# Patient Record
Sex: MALE | Race: WHITE | Employment: OTHER | URBAN - METROPOLITAN AREA
[De-identification: names, ages, dates, MRNs, and addresses within clinical notes are randomized per-mention and may not be internally consistent; named-entity substitution may affect disease eponyms.]

---

## 2017-04-04 ENCOUNTER — HOSPITAL ENCOUNTER (OUTPATIENT)
Age: 72
Setting detail: OUTPATIENT SURGERY
Discharge: HOME | End: 2017-04-04
Attending: SPECIALIST | Admitting: SPECIALIST
Payer: MEDICARE

## 2017-04-04 ENCOUNTER — ANESTHESIA (OUTPATIENT)
Dept: ENDOSCOPY | Age: 72
End: 2017-04-04
Payer: MEDICARE

## 2017-04-04 ENCOUNTER — SURGERY (OUTPATIENT)
Age: 72
End: 2017-04-04

## 2017-04-04 ENCOUNTER — ANESTHESIA EVENT (OUTPATIENT)
Dept: ENDOSCOPY | Age: 72
End: 2017-04-04
Payer: MEDICARE

## 2017-04-04 VITALS
HEART RATE: 69 BPM | WEIGHT: 185 LBS | RESPIRATION RATE: 16 BRPM | OXYGEN SATURATION: 98 % | BODY MASS INDEX: 27.4 KG/M2 | SYSTOLIC BLOOD PRESSURE: 136 MMHG | DIASTOLIC BLOOD PRESSURE: 78 MMHG | HEIGHT: 69 IN | TEMPERATURE: 97.8 F

## 2017-04-04 LAB
GLUCOSE BLD STRIP.AUTO-MCNC: 138 MG/DL (ref 65–100)
SERVICE CMNT-IMP: ABNORMAL

## 2017-04-04 PROCEDURE — 76060000031 HC ANESTHESIA FIRST 0.5 HR: Performed by: SPECIALIST

## 2017-04-04 PROCEDURE — 82962 GLUCOSE BLOOD TEST: CPT

## 2017-04-04 PROCEDURE — 74011250636 HC RX REV CODE- 250/636

## 2017-04-04 PROCEDURE — 76040000019: Performed by: SPECIALIST

## 2017-04-04 RX ORDER — FENTANYL CITRATE 50 UG/ML
100 INJECTION, SOLUTION INTRAMUSCULAR; INTRAVENOUS ONCE
Status: DISCONTINUED | OUTPATIENT
Start: 2017-04-04 | End: 2017-04-04 | Stop reason: HOSPADM

## 2017-04-04 RX ORDER — SODIUM CHLORIDE 9 MG/ML
50 INJECTION, SOLUTION INTRAVENOUS CONTINUOUS
Status: DISCONTINUED | OUTPATIENT
Start: 2017-04-04 | End: 2017-04-04 | Stop reason: HOSPADM

## 2017-04-04 RX ORDER — MIDAZOLAM HYDROCHLORIDE 1 MG/ML
.25-5 INJECTION, SOLUTION INTRAMUSCULAR; INTRAVENOUS
Status: DISCONTINUED | OUTPATIENT
Start: 2017-04-04 | End: 2017-04-04 | Stop reason: HOSPADM

## 2017-04-04 RX ORDER — DEXTROMETHORPHAN/PSEUDOEPHED 2.5-7.5/.8
1.2 DROPS ORAL
Status: DISCONTINUED | OUTPATIENT
Start: 2017-04-04 | End: 2017-04-04 | Stop reason: HOSPADM

## 2017-04-04 RX ORDER — ATROPINE SULFATE 0.1 MG/ML
0.5 INJECTION INTRAVENOUS
Status: DISCONTINUED | OUTPATIENT
Start: 2017-04-04 | End: 2017-04-04 | Stop reason: HOSPADM

## 2017-04-04 RX ORDER — PROPOFOL 10 MG/ML
INJECTION, EMULSION INTRAVENOUS AS NEEDED
Status: DISCONTINUED | OUTPATIENT
Start: 2017-04-04 | End: 2017-04-04 | Stop reason: HOSPADM

## 2017-04-04 RX ORDER — PROPOFOL 10 MG/ML
INJECTION, EMULSION INTRAVENOUS
Status: DISCONTINUED | OUTPATIENT
Start: 2017-04-04 | End: 2017-04-04 | Stop reason: HOSPADM

## 2017-04-04 RX ORDER — FLUMAZENIL 0.1 MG/ML
0.2 INJECTION INTRAVENOUS
Status: DISCONTINUED | OUTPATIENT
Start: 2017-04-04 | End: 2017-04-04 | Stop reason: HOSPADM

## 2017-04-04 RX ORDER — EPINEPHRINE 0.1 MG/ML
1 INJECTION INTRACARDIAC; INTRAVENOUS
Status: DISCONTINUED | OUTPATIENT
Start: 2017-04-04 | End: 2017-04-04 | Stop reason: HOSPADM

## 2017-04-04 RX ORDER — NALOXONE HYDROCHLORIDE 0.4 MG/ML
0.4 INJECTION, SOLUTION INTRAMUSCULAR; INTRAVENOUS; SUBCUTANEOUS
Status: DISCONTINUED | OUTPATIENT
Start: 2017-04-04 | End: 2017-04-04 | Stop reason: HOSPADM

## 2017-04-04 RX ORDER — SODIUM CHLORIDE 9 MG/ML
INJECTION, SOLUTION INTRAVENOUS
Status: DISCONTINUED | OUTPATIENT
Start: 2017-04-04 | End: 2017-04-04 | Stop reason: HOSPADM

## 2017-04-04 RX ADMIN — PROPOFOL 100 MG: 10 INJECTION, EMULSION INTRAVENOUS at 09:54

## 2017-04-04 RX ADMIN — PROPOFOL 100 MCG/KG/MIN: 10 INJECTION, EMULSION INTRAVENOUS at 09:54

## 2017-04-04 RX ADMIN — SODIUM CHLORIDE: 9 INJECTION, SOLUTION INTRAVENOUS at 09:38

## 2017-04-04 NOTE — IP AVS SNAPSHOT
[address] [place] [address] 
[phone] Patient: Sveta Durant MRN: [id number] [facility]:9/80/9573 You are allergic to the following Allergen Reactions Zocor (Simvastatin) Myalgia Motrin (Ibuprofen) Rash Other Medication Unknown (comments) SHRIMP Recent Documentation Height Weight BMI Smoking Status 1.753 m 83.9 kg 27.32 kg/m2 Never Smoker Emergency Contacts Name Discharge Info Relation Home Work Mobile Neris Mcguire  Child [2] [phone] About your hospitalization You were admitted on:  April 4, 2017 You last received care in the:  [facility] ENDOSCOPY You were discharged on:  April 4, 2017 Unit phone number:  [phone] Why you were hospitalized Your primary diagnosis was:  Not on File Providers Seen During Your Hospitalizations Provider Role Specialty Primary office phone Geovani Baez MD Attending Provider Gastroenterology [phone] Your Primary Care Physician (PCP) Primary Care Physician Office Phone Office Fax Rai Andersen [phone] Follow-up Information None Current Discharge Medication List  
  
CONTINUE these medications which have NOT CHANGED Dose & Instructions Dispensing Information Comments Morning Noon Evening Bedtime  
 amLODIPine 10 mg tablet Commonly known as:  Claudell Hesselbach Your last dose was: Your next dose is:    
   
   
 Dose:  5 mg Take 5 mg by mouth daily. Indications: HYPERTENSION Refills:  0  
     
   
   
   
  
 aspirin 81 mg tablet Your last dose was: Your next dose is:    
   
   
 Dose:  81 mg Take 81 mg by mouth daily. Refills:  0  
     
   
   
   
  
 clopidogrel 75 mg Tab Commonly known as:  PLAVIX Your last dose was:     
   
Your next dose is:    
   
   
 Dose:  75 mg  
 Take 1 Tab by mouth daily. Quantity:  30 Tab Refills:  6  
     
   
   
   
  
 coenzyme Q-10 200 mg capsule Commonly known as:  CO Q-10 Your last dose was: Your next dose is: Take  by mouth daily. Refills:  0  
     
   
   
   
  
 FLEXERIL PO Your last dose was: Your next dose is: Take  by mouth as needed. Refills:  0  
     
   
   
   
  
 metFORMIN  mg tablet Commonly known as:  glucophage XR Your last dose was: Your next dose is: TAKE 2 TABLETS BY MOUTH  TWICE A DAY Quantity:  60 Tab Refills:  5  
     
   
   
   
  
 metoprolol tartrate 25 mg tablet Commonly known as:  LOPRESSOR Your last dose was: Your next dose is:    
   
   
 Dose:  25 mg Take 1 Tab by mouth every twelve (12) hours. Quantity:  60 Tab Refills:  6  
     
   
   
   
  
 oxyCODONE IR 5 mg immediate release tablet Commonly known as:  Calderon Andrzej Your last dose was: Your next dose is:    
   
   
 Dose:  5 mg Take 1 Tab by mouth every four (4) hours as needed. Quantity:  50 Tab Refills:  0  
     
   
   
   
  
 valsartan-hydroCHLOROthiazide 160-12.5 mg per tablet Commonly known as:  DIOVAN HCT Your last dose was: Your next dose is: TAKE 1 TABLET BY MOUTH   EACH DAY Quantity:  30 Tab Refills:  5 VITAMIN B-12 PO Your last dose was: Your next dose is: Take  by mouth daily. Refills:  0 Discharge Instructions Derian Dhaliwal. Ari Nava, [address] [place] [address] 
[phone] April 4, 2017 Bridget Conti YOB: 1945 COLONOSCOPY DISCHARGE INSTRUCTIONS If there is redness at IV site you should apply warm compress to area.   If redness or soreness persist contact Dr. Luis Alberto Mullins' or your primary care doctor. There may be a slight amount of blood passed from the rectum. Gaseous discomfort may develop, but walking, belching will help relieve this. You may not operate a vehicle for 12 hours You may not operate machinery or dangerous appliances for rest of today You may not drink alcoholic beverages for 12 hours Avoid making any critical decisions for 24 hours DIET: 
You may resume your normal diet, but some patients find that heavy or large meals may lead to indigestion or vomiting. I suggest a light meal as first food intake. MEDICATIONS: 
The use of some over-the-counter pain medication may lead to bleeding after colon biopsies or polyp removal.  Tylenol (also called acetaminophen) is safe to take even if you have had colonoscopy with polyp removal.  Based on the procedure you had today you may safely take aspirin or aspirin-like products for the next ten (10) days. Remember that Tylenol (also called acetaminophen) is safe to take after colonoscopy even if you have had biopsies or polyps removed. ACTIVITY: 
You may resume your normal household activities, but it is recommended that you spend the remainder of the day resting -  avoid any strenuous activity. CALL DR. Nabil Osborne' OFFICE IF: Increasing pain, nausea, vomiting Abdominal distension (swelling) Significant new or increased bleeding (oral or rectal) Fever/Chills Chest pain/shortness of breath Additional instructions:  
Normal colonoscopy and upper GI endoscopy today. No cancer or ulcer or bleeding. I suggest that you try to take iron for 2 months and then have your blood retested to see if that solves the anemia. It was an honor to be your doctor today. Please let me or my office staff know if you have any feedback about today's procedure. Aimee Huff MD 
 
Colonoscopy saves lives, and can prevent colon cancer.   Everyone aged 48 or older needs colonoscopy. Tell your family and friends: get the test! 
 
 
 
 
Discharge Orders None Introducing [facility] [place] [facility]! Dear Pérez Samuel: 
Thank you for requesting a [employer] account. Our records indicate that you already have an active [employer] account. You can access your account anytime at [email or web address] Did you know that you can access your hospital and ER discharge instructions at any time in [employer]? You can also review all of your test results from your hospital stay or ER visit. Additional Information If you have questions, please visit the Frequently Asked Questions section of the [employer] website at [email or web address] Remember, [employer] is NOT to be used for urgent needs. For medical emergencies, dial 911. Now available from your iPhone and Android! General Information Please provide this summary of care documentation to your next provider. Patient Signature:  ____________________________________________________________ Date:  ____________________________________________________________  
  
Marilee Brought Provider Signature:  ____________________________________________________________ Date:  ____________________________________________________________

## 2017-04-04 NOTE — INTERVAL H&P NOTE
H&P Update:  Aneta Pizarro was seen and examined. History and physical has been reviewed. The patient has been examined.  There have been no significant clinical changes since the completion of the originally dated History and Physical.    Signed By: Fox Faulkner MD     April 4, 2017 10:07 AM

## 2017-04-04 NOTE — PROCEDURES
[facility] DAYANA Baltazar MD  [phone]      2017    Esophagogastroduodenoscopy & Colonoscopy Procedure Note  [place]  : 1945  Artur Webber Medical Record Number: [id number]      Indications:    Anemia  Screening Colonoscopy   Referring Physician:  You Anne MD  Anesthesia/Sedation: Conscious Sedation/Moderate Sedation/MAC  Endoscopist:  Dr. Kristina Sarah  Complications:  None  Estimated Blood Loss:  None    Permit:  The indications, risks, benefits and alternatives were reviewed with the patient or their decision maker who was provided an opportunity to ask questions and all questions were answered. The specific risks of esophagogastroduodenoscopy with conscious sedation were reviewed, including but not limited to anesthetic complication, bleeding, adverse drug reaction, missed lesion, infection, IV site reactions, and intestinal perforation which would lead to the need for surgical repair. Alternatives to EGD and colonoscopy including radiographic imaging, observation without testing, or laboratory testing were reviewed as well as the limitations of those alternatives discussed. After considering the options and having all their questions answered, the patient or their decision maker provided both verbal and written consent to proceed. -----------EGD------------   Procedure in Detail:  After obtaining informed consent, positioning of the patient in the left lateral decubitus position, and conduction of a pre-procedure pause or \"time out\" the endoscope was introduced into the mouth and advanced to the duodenum. A careful inspection was made, and findings or interventions are described below.     Findings:   Esophagus:normal  Stomach: A few small fundic gland polyps otherwise normal.  Duodenum/jejunum: normal        ----------Colonoscopy-----------    Procedure in Detail:  After obtaining informed consent, positioning of the patient in the left lateral decubitus position, and conduction of a pre-procedure pause or \"time out\" the endoscope was introduced into the anus and advanced to the cecum, which was identified by the ileocecal valve and appendiceal orifice. The quality of the colonic preparation was good. A careful inspection was made as the colonoscope was withdrawn, findings and interventions are described below. Findings: There is diverticulosis in the sigmoid colon without complications such as bleeding, inflammatory change, or luminal narrowing. In the rectum, medium internal hemorrhoids are noted without bleeding.      ------------------------------  Specimens:    none    Complications:   None; patient tolerated the procedure well. Impressions:  EGD:  Normal   Colonoscopy: Diverticulosis and hemorrhoids. Recommendations:      - Iron supplementation and repeat blood counts after 6-8 weeks    Thank you for entrusting me with this patient's care. Please do not hesitate to contact me with any questions or if I can be of assistance with any of your other patients' GI needs.     Signed By: Olvin Powers MD                        April 4, 2017

## 2017-04-04 NOTE — H&P
Date: 3/23/2017 1:15 PM   Patient Name: Vaishali Sofia   Account #: [id number]    Gender: Male    (age): 1945 (72)       Provider:     Blayne Bennett. Wilmer Simmons MD        Referring Physician:     Jakob Diaz [address], [place], [address]  [phone] (phone)  [phone] (fax)     Shea Jeffrey MD  [address] [address]  [phone] (phone)  [phone] (fax)     Karmenelene Grain 706 [facility], [address], [address] [facility]  [phone] (phone)  [phone] (fax)        Chief Complaint: Anemia           History of Present Illness:   Vaishali Sofia is seen for an initial visit today. Anemia detected on routine tests. Hgb 13. He reports occasional diarrhea  Black stool 2 weeks ago, no abdominal pain, no vomiting, no weight loss    Overall his health is not good, has coronary disease, status post CABG and stents and has pacer/defibrillator, has diabetes with glove neuropathy and there is concern he may have a form of ataxia. We discussed the indications,r isks, benefits and alternatives to diagnostic EGD and colonoscopy and he asked we proceed. ? ? ? Sukhwinder García? ? Trey Flores? is seen for an initial visit today. ? Anemia detected on routine tests. Hgb 13. He reports occasional diarrhea  Black stool 2 weeks ago, no abdominal pain, no vomiting, no weight loss    Overall his health is not good, has coronary disease, status post CABG and stents and has pacer/defibrillator, has diabetes with glove neuropathy and there is concern he may have a form of ataxia. We discussed the indications,r isks, benefits and alternatives to diagnostic EGD and colonoscopy and he asked we proceed. ? Past Medical History      Medical Conditions: Aortic Valve Disorder  Cardiac implant/device  High blood pressure  High cholesterol  Pacemaker  S/P Defibralltor  S/P Stents x3  Thyroid disease   Surgical Procedures:  Other major surgeries:, 2013  Pacemaker  Open Heart   Dx Studies: No Prior Diagnostic Studies   Medications: amlodipine 2.5 mg  bumetanide 0.5 mg  clopidogrel 75 mg  Colcrys 0.6 mg  Foltanx 3-35-2 mg  glimepiride 2 mg  levothyroxine 25 mcg  losartan 25 mg  metformin 500 mg  metoprolol succinate 25 mg  pantoprazole 40 mg  tamsulosin 0.4 mg  Viagra 25 mg   Allergies: Motrin PM  Shrimp   Immunizations: Flu vaccine, 2016      Social History      Alcohol: None   Tobacco: Never smoker   Drugs: None   Exercise: Exercise less than 3 times a week. Caffeine: None   Marital Status:          Occupation:               Family History No history of Colon Cancer, Colon Polyps, Esophogeal Cancer, GI Cancers, IBD (Crohn's or UC), Liver disease        Review of Systems:   Cardiovascular: Denies chest pain, irregular heart beat, palpitations, peripheral edema, syncope, Sweats. Constitutional: Denies fatigue, fever, loss of appetite, weight gain, weight loss. ENMT: Denies nose bleeds, sore throat, hearing loss. Endocrine: Denies excessive thirst, heat intolerance. Eyes: Denies loss of vision. Gastrointestinal: Denies abdominal pain, abdominal swelling, change in bowel habits, constipation, diarrhea, Bloating/gas, heartburn, jaundice, nausea, rectal bleeding, stomach cramps, vomiting, dysphagia, rectal pain, Stool incontinence. Genitourinary: Denies dark urine, dysuria, frequent urination, hematuria, incontinence. Hematologic/Lymphatic: Denies easy bruising, prolonged bleeding. Integumentary: Denies itching, rashes, sun sensitivity. Musculoskeletal: Presents suffers from gout, muscle weakness. Denies arthritis, back pain, joint pain, stiffness. Neurological: Denies dizziness, fainting, frequent headaches, memory loss. Psychiatric: Denies anxiety, depression, difficulty sleeping, hallucinations, nervousness, panic attacks, paranoia. Respiratory: Denies cough, dyspnea, wheezing.       Vital Signs:   BP  (mmHg)  Pulse  (ppm) Rhythm Weight (lbs/oz) Height (ft/in) BMI Resp/min Temp   121/74 92 Regular 188 /   26.22 12 98.8 (F)      Physical Exam:   Constitutional:      Appearance: No distress, appears comfortable. Communication: Understands/receives spoken information. Skin:      Inspection: No rash, no jaundice. Head/face: Inspection: Normacephalic, atraumatic. Eyes:      Conjunctivae/lids: Normal.   Pupils/irises: Pupils equal, round and normal.   ENMT:      External: Normal.   Hearing: Normal.   Neck:      Neck: Normal appearance, trachea midline. Jugular veins: No JVD noted. Respiratory:      Effort: Normal respiratory effort, comfortable, speaks in complete sentences. Musculoskeletal:      Gait/station: normal.   Digits/nails: Normal, no spooning of nails, clubbing, or splinter hemorrhages, no clubbing, cyanosis, petechiae or other inflammatory conditions. Psychiatric:      Judgment/insight: Normal, normal judgement, normal insight. Mood and affect: Normal mood, affect full, no evidence of depression, anxiety or agitation. Lab Results: No Lab Results      Impressions: Anemia, unspecified  Diarrhea, unspecified? ? Anemia, unspecified? ?  ? ? Diarrhea, unspecified? Assessment: ?         Plan: Upper Endoscopy  Colonoscopy? ? Upper Endoscopy? ?  ? ? Colonoscopy? Risk & Medical Necessity: The patient requires Moderate Severity care for this visit. Diagnosis and management options are Multiple. The amount of data reviewed and/or ordered is Minimal/None. The level of risk is Moderate. Notes:              Joselyn Kimball MD     Electronically signed on 3/23/2017 1:53:37 PM by Joselyn Kimball, [facility] Thompsonre De Katalina, MRN [id number],  1945 First Visit,

## 2017-04-04 NOTE — ANESTHESIA PREPROCEDURE EVALUATION
Anesthetic History   No history of anesthetic complications            Review of Systems / Medical History  Patient summary reviewed, nursing notes reviewed and pertinent labs reviewed    Pulmonary        Sleep apnea (no cpap)           Neuro/Psych         TIA    Comments: Essential tremors Cardiovascular    Hypertension  Valvular problems/murmurs (aortic valve (porcine))        Pacemaker (2015  ), cardiac stents (on plavix (yesterday)) and CABG    Exercise tolerance: >4 METS  Comments: metoprolol (LOPRESSOR) 25 mg tablet   4/4/2017       GI/Hepatic/Renal  Within defined limits              Endo/Other    Diabetes    Anemia     Other Findings   Comments: ANEMIA/DIARRHEA  spinal stabalization with fusion X5           Physical Exam    Airway  Mallampati: II  TM Distance: 4 - 6 cm  Neck ROM: normal range of motion   Mouth opening: Normal     Cardiovascular  Regular rate and rhythm,  S1 and S2 normal,  no murmur, click, rub, or gallop  Rhythm: regular  Rate: normal         Dental    Dentition: Full upper dentures and Lower partial plate     Pulmonary  Breath sounds clear to auscultation               Abdominal  GI exam deferred       Other Findings            Anesthetic Plan    ASA: 4  Anesthesia type: MAC            Anesthetic plan and risks discussed with: Patient

## 2017-04-04 NOTE — ANESTHESIA POSTPROCEDURE EVALUATION
Post-Anesthesia Evaluation and Assessment    Patient: Brittney Boogie MRN: [id number]  SSN: [id number]    YOB: 1945  Age: 74 y.o. Sex: male       Cardiovascular Function/Vital Signs  Visit Vitals    /64    Pulse 69    Temp 36.4 °C (97.6 °F)    Resp 18    Ht 5' 9\" (1.753 m)    Wt 83.9 kg (185 lb)    SpO2 95%    BMI 27.32 kg/m2       Patient is status post MAC anesthesia for Procedure(s):  ESOPHAGOGASTRODUODENOSCOPY (EGD)/COLONOSCOPY  COLONOSCOPY. Nausea/Vomiting: None    Postoperative hydration reviewed and adequate. Pain:  Pain Scale 1: Visual (04/04/17 1018)  Pain Intensity 1: 0 (04/04/17 1018)   Managed    Neurological Status: At baseline    Mental Status and Level of Consciousness: Arousable    Pulmonary Status:   O2 Device: Nasal cannula (04/04/17 1018)   Adequate oxygenation and airway patent    Complications related to anesthesia: None    Post-anesthesia assessment completed.  No concerns    Signed By: Harmeet Love MD     April 4, 2017

## 2017-04-04 NOTE — DISCHARGE INSTRUCTIONS
[facility] DYAANA Alexander MD  [phone]      April 4, 2017    [facility] Shawna\[facility] [place] [facility]\"": 1945    COLONOSCOPY DISCHARGE INSTRUCTIONS    If there is redness at IV site you should apply warm compress to area. If redness or soreness persist contact Dr. Pia Alexander' or your primary care doctor. There may be a slight amount of blood passed from the rectum. Gaseous discomfort may develop, but walking, belching will help relieve this. You may not operate a vehicle for 12 hours  You may not operate machinery or dangerous appliances for rest of today  You may not drink alcoholic beverages for 12 hours  Avoid making any critical decisions for 24 hours    DIET:  You may resume your normal diet, but some patients find that heavy or large meals may lead to indigestion or vomiting. I suggest a light meal as first food intake. MEDICATIONS:  The use of some over-the-counter pain medication may lead to bleeding after colon biopsies or polyp removal.  Tylenol (also called acetaminophen) is safe to take even if you have had colonoscopy with polyp removal.  Based on the procedure you had today you may safely take aspirin or aspirin-like products for the next ten (10) days. Remember that Tylenol (also called acetaminophen) is safe to take after colonoscopy even if you have had biopsies or polyps removed. ACTIVITY:  You may resume your normal household activities, but it is recommended that you spend the remainder of the day resting -  avoid any strenuous activity. CALL DR. Jed Shepherd' OFFICE IF:  Increasing pain, nausea, vomiting  Abdominal distension (swelling)  Significant new or increased bleeding (oral or rectal)  Fever/Chills  Chest pain/shortness of breath                       Additional instructions:   Normal colonoscopy and upper GI endoscopy today. No cancer or ulcer or bleeding.   I suggest that you try to take iron for 2 months and then have your blood retested to see if that solves the anemia. It was an honor to be your doctor today. Please let me or my office staff know if you have any feedback about today's procedure. Tenisha Donahue MD    Colonoscopy saves lives, and can prevent colon cancer. Everyone aged 48 or older needs colonoscopy.   Tell your family and friends: get the test!

## 2017-04-12 ENCOUNTER — HOSPITAL ENCOUNTER (OUTPATIENT)
Dept: ULTRASOUND IMAGING | Age: 72
Discharge: HOME | End: 2017-04-12
Attending: INTERNAL MEDICINE
Payer: MEDICARE

## 2017-04-12 DIAGNOSIS — R79.89 OTHER ABNORMAL BLOOD CHEMISTRY: ICD-10-CM

## 2017-04-12 DIAGNOSIS — I10 ESSENTIAL HYPERTENSION, MALIGNANT: ICD-10-CM

## 2017-04-12 PROCEDURE — 76770 US EXAM ABDO BACK WALL COMP: CPT

## 2022-09-28 NOTE — PERIOP NOTES
Pt resting comfortably on stretcher HOB up VSS call bell within reach sister at bedside awaiting MD for procedure will continue to monitor pt. Detail Level: Detailed Detail Level: Zone

## (undated) DEVICE — SET ADMIN 16ML TBNG L100IN 2 Y INJ SITE IV PIGGY BK DISP

## (undated) DEVICE — BITEBLOCK ENDOSCP 60FR MAXI WHT POLYETH STURDY W/ VELC WVN

## (undated) DEVICE — SYR 5ML 1/5 GRAD LL NSAF LF --

## (undated) DEVICE — SYR 3ML LL TIP 1/10ML GRAD --

## (undated) DEVICE — ADULT SPO2 SENSOR: Brand: NELLCOR

## (undated) DEVICE — Device

## (undated) DEVICE — KENDALL RADIOLUCENT FOAM MONITORING ELECTRODE -RECTANGULAR SHAPE: Brand: KENDALL

## (undated) DEVICE — CANN NASAL O2 CAPNOGRAPHY AD -- FILTERLINE

## (undated) DEVICE — BAG BELONG PT PERS CLEAR HANDL

## (undated) DEVICE — KIT COLON W/ 1.1OZ LUB AND 2 END

## (undated) DEVICE — NDL PRT INJ NSAF BLNT 18GX1.5 --

## (undated) DEVICE — SOLIDIFIER MEDC 1200ML -- CONVERT TO 356117

## (undated) DEVICE — CATH IV AUTOGRD BC BLU 22GA 25 -- INSYTE

## (undated) DEVICE — NDL FLTR TIP 5 MIC 18GX1.5IN --

## (undated) DEVICE — BASIN EMESIS 500CC ROSE 250/CS 60/PLT: Brand: MEDEGEN MEDICAL PRODUCTS, LLC

## (undated) DEVICE — 1200 GUARD II KIT W/5MM TUBE W/O VAC TUBE: Brand: GUARDIAN